# Patient Record
Sex: MALE | Race: WHITE | Employment: OTHER | URBAN - METROPOLITAN AREA
[De-identification: names, ages, dates, MRNs, and addresses within clinical notes are randomized per-mention and may not be internally consistent; named-entity substitution may affect disease eponyms.]

---

## 2018-07-10 ENCOUNTER — HOSPITAL ENCOUNTER (EMERGENCY)
Age: 58
Discharge: HOME | End: 2018-07-10
Attending: EMERGENCY MEDICINE | Admitting: EMERGENCY MEDICINE
Payer: MEDICARE

## 2018-07-10 VITALS
RESPIRATION RATE: 16 BRPM | TEMPERATURE: 98.2 F | WEIGHT: 204 LBS | SYSTOLIC BLOOD PRESSURE: 127 MMHG | HEIGHT: 70 IN | BODY MASS INDEX: 29.2 KG/M2 | DIASTOLIC BLOOD PRESSURE: 73 MMHG | HEART RATE: 84 BPM | OXYGEN SATURATION: 96 %

## 2018-07-10 DIAGNOSIS — G51.0 BELL'S PALSY: Primary | ICD-10-CM

## 2018-07-10 LAB
ALBUMIN SERPL-MCNC: 3.7 G/DL (ref 3.5–5)
ALBUMIN/GLOB SERPL: 1 {RATIO} (ref 1.1–2.2)
ALP SERPL-CCNC: 77 U/L (ref 45–117)
ALT SERPL-CCNC: 21 U/L (ref 12–78)
ANION GAP SERPL CALC-SCNC: 5 MMOL/L (ref 5–15)
AST SERPL-CCNC: 12 U/L (ref 15–37)
BASOPHILS # BLD: 0 K/UL (ref 0–0.1)
BASOPHILS NFR BLD: 1 % (ref 0–1)
BILIRUB SERPL-MCNC: 0.7 MG/DL (ref 0.2–1)
BUN SERPL-MCNC: 14 MG/DL (ref 6–20)
BUN/CREAT SERPL: 16 (ref 12–20)
CALCIUM SERPL-MCNC: 8.7 MG/DL (ref 8.5–10.1)
CHLORIDE SERPL-SCNC: 110 MMOL/L (ref 97–108)
CO2 SERPL-SCNC: 28 MMOL/L (ref 21–32)
CREAT SERPL-MCNC: 0.9 MG/DL (ref 0.7–1.3)
DIFFERENTIAL METHOD BLD: NORMAL
EOSINOPHIL # BLD: 0.1 K/UL (ref 0–0.4)
EOSINOPHIL NFR BLD: 2 % (ref 0–7)
ERYTHROCYTE [DISTWIDTH] IN BLOOD BY AUTOMATED COUNT: 12.8 % (ref 11.5–14.5)
GLOBULIN SER CALC-MCNC: 3.6 G/DL (ref 2–4)
GLUCOSE SERPL-MCNC: 110 MG/DL (ref 65–100)
HCT VFR BLD AUTO: 46.3 % (ref 36.6–50.3)
HGB BLD-MCNC: 15.7 G/DL (ref 12.1–17)
IMM GRANULOCYTES # BLD: 0 K/UL (ref 0–0.04)
IMM GRANULOCYTES NFR BLD AUTO: 0 % (ref 0–0.5)
LYMPHOCYTES # BLD: 1.5 K/UL (ref 0.8–3.5)
LYMPHOCYTES NFR BLD: 28 % (ref 12–49)
MCH RBC QN AUTO: 32.8 PG (ref 26–34)
MCHC RBC AUTO-ENTMCNC: 33.9 G/DL (ref 30–36.5)
MCV RBC AUTO: 96.9 FL (ref 80–99)
MONOCYTES # BLD: 0.6 K/UL (ref 0–1)
MONOCYTES NFR BLD: 11 % (ref 5–13)
NEUTS SEG # BLD: 3.1 K/UL (ref 1.8–8)
NEUTS SEG NFR BLD: 58 % (ref 32–75)
NRBC # BLD: 0 K/UL (ref 0–0.01)
NRBC BLD-RTO: 0 PER 100 WBC
PLATELET # BLD AUTO: 212 K/UL (ref 150–400)
PMV BLD AUTO: 9.3 FL (ref 8.9–12.9)
POTASSIUM SERPL-SCNC: 3.9 MMOL/L (ref 3.5–5.1)
PROT SERPL-MCNC: 7.3 G/DL (ref 6.4–8.2)
RBC # BLD AUTO: 4.78 M/UL (ref 4.1–5.7)
SODIUM SERPL-SCNC: 143 MMOL/L (ref 136–145)
WBC # BLD AUTO: 5.3 K/UL (ref 4.1–11.1)

## 2018-07-10 PROCEDURE — 99283 EMERGENCY DEPT VISIT LOW MDM: CPT

## 2018-07-10 PROCEDURE — 36415 COLL VENOUS BLD VENIPUNCTURE: CPT

## 2018-07-10 PROCEDURE — 85025 COMPLETE CBC W/AUTO DIFF WBC: CPT

## 2018-07-10 PROCEDURE — 80053 COMPREHEN METABOLIC PANEL: CPT

## 2018-07-10 RX ORDER — PREDNISONE 20 MG/1
60 TABLET ORAL DAILY
Qty: 21 TAB | Refills: 0 | Status: SHIPPED | OUTPATIENT
Start: 2018-07-10 | End: 2018-07-17

## 2018-07-10 RX ORDER — VALACYCLOVIR HYDROCHLORIDE 1 G/1
1000 TABLET, FILM COATED ORAL 3 TIMES DAILY
Qty: 21 TAB | Refills: 0 | Status: SHIPPED | OUTPATIENT
Start: 2018-07-10 | End: 2018-07-17

## 2018-07-10 NOTE — ED NOTES
Pt discharged by MD Roman . Pt provided with discharge instructions Rx and instructions on follow up care. Pt out of ED ambulatory accompanied by family member.
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
## 2018-07-10 NOTE — DISCHARGE INSTRUCTIONS
Bell's Palsy: Care Instructions  Your Care Instructions    Bell's palsy is paralysis or weakness of the muscles on one side of the face. Often people with Bell's palsy have a droop on one side of the mouth and have trouble completely shutting the eye on the same side. Bell's palsy can also interfere with the sense of taste. These things happen when a nerve in your face becomes inflamed. Bell's palsy is not caused by a stroke. The cause of the nerve inflammation is not known. But some experts think that a virus may cause it. Because of this, doctors sometimes prescribe antiviral medicine to treat it. You also may get medicine to reduce swelling. Bell's palsy usually gets better on its own in a few weeks or months. Follow-up care is a key part of your treatment and safety. Be sure to make and go to all appointments, and call your doctor if you are having problems. It's also a good idea to know your test results and keep a list of the medicines you take. How can you care for yourself at home? · Take your medicines exactly as prescribed. Call your doctor if you think you are having a problem with your medicine. You will get more details on the specific medicines your doctor prescribes. · Use artificial tears or ointment if your eyes are too dry. Bell's palsy can make your lower eyelid droop, causing a dry eye. · If you cannot completely close your eye, consider using an eye patch while you sleep. · Help yourself blink by using your finger to close and open your eyelid. This may help keep your eye moist.  · Wear glasses or goggles to keep dust and dirt out of your eye. · As feeling comes back to your face, massage your forehead, cheeks, and lips. Massage may make the muscles in your face stronger. · Brush and floss your teeth often to help prevent tooth decay. Bell's palsy can dry up the spit on one side of your mouth. This increases the risk of tooth decay. When should you call for help?   Call 911 anytime you think you may need emergency care. For example, call if:  ? · You have symptoms of a stroke. These may include:  ¨ Sudden numbness, tingling, weakness, or loss of movement in your face, arm, or leg, especially on only one side of your body. ¨ Sudden vision changes. ¨ Sudden trouble speaking. ¨ Sudden confusion or trouble understanding simple statements. ¨ Sudden problems with walking or balance. ¨ A sudden, severe headache that is different from past headaches. ?Call your doctor now or seek immediate medical care if:  ? · You have numbness or weakness that spreads beyond one side of your face. ? · You have a skin rash or eye pain or redness, or light bothers your eyes. ? · You have a new or worse headache. ? Watch closely for changes in your health, and be sure to contact your doctor if:  ? · You do not get better as expected. Where can you learn more? Go to http://elsa-newton.info/. Enter P168 in the search box to learn more about \"Bell's Palsy: Care Instructions. \"  Current as of: October 14, 2016  Content Version: 11.4  © 2984-0294 [employer]. Care instructions adapted under license by [employer] (which disclaims liability or warranty for this information). If you have questions about a medical condition or this instruction, always ask your healthcare professional. [address] any warranty or liability for your use of this information.

## 2018-07-10 NOTE — ED NOTES
Patient resting with family at bedside awaiting results. Patient denies any need at this time. Call bell in reach.

## 2018-07-10 NOTE — ED NOTES
Assumed care of patient. Patient states he noticed left sided facial droop, numbess to face on Saturday. Patient thought possibly have reaction to spicy food. Patient denies SOB, CP. Patient placed on monitor x 3. Patient is ANO x 4. Family at bedside. Call bell in reach.

## 2018-11-26 ENCOUNTER — APPOINTMENT (OUTPATIENT)
Dept: GENERAL RADIOLOGY | Age: 58
End: 2018-11-26
Attending: EMERGENCY MEDICINE
Payer: MEDICARE

## 2018-11-26 ENCOUNTER — HOSPITAL ENCOUNTER (EMERGENCY)
Age: 58
Discharge: HOME | End: 2018-11-26
Attending: EMERGENCY MEDICINE
Payer: MEDICARE

## 2018-11-26 VITALS
HEIGHT: 69 IN | HEART RATE: 97 BPM | WEIGHT: 184.75 LBS | BODY MASS INDEX: 27.36 KG/M2 | DIASTOLIC BLOOD PRESSURE: 91 MMHG | OXYGEN SATURATION: 96 % | SYSTOLIC BLOOD PRESSURE: 155 MMHG | TEMPERATURE: 98.5 F | RESPIRATION RATE: 16 BRPM

## 2018-11-26 DIAGNOSIS — J06.9 ACUTE UPPER RESPIRATORY INFECTION: ICD-10-CM

## 2018-11-26 DIAGNOSIS — Z72.0 TOBACCO USE: ICD-10-CM

## 2018-11-26 DIAGNOSIS — I10 ACCELERATED HYPERTENSION: ICD-10-CM

## 2018-11-26 DIAGNOSIS — J02.9 ACUTE VIRAL PHARYNGITIS: ICD-10-CM

## 2018-11-26 DIAGNOSIS — J20.9 ACUTE BRONCHITIS, UNSPECIFIED ORGANISM: Primary | ICD-10-CM

## 2018-11-26 PROCEDURE — 71046 X-RAY EXAM CHEST 2 VIEWS: CPT

## 2018-11-26 PROCEDURE — 99282 EMERGENCY DEPT VISIT SF MDM: CPT

## 2018-11-26 RX ORDER — BENZONATATE 100 MG/1
100 CAPSULE ORAL
Qty: 30 CAP | Refills: 0 | Status: SHIPPED | OUTPATIENT
Start: 2018-11-26 | End: 2018-12-03

## 2018-11-26 RX ORDER — PREDNISONE 10 MG/1
TABLET ORAL
Qty: 21 TAB | Refills: 0 | Status: SHIPPED | OUTPATIENT
Start: 2018-11-26

## 2018-11-26 RX ORDER — AZITHROMYCIN 250 MG/1
TABLET, FILM COATED ORAL
Qty: 6 TAB | Refills: 0 | Status: SHIPPED | OUTPATIENT
Start: 2018-11-26 | End: 2018-12-01

## 2018-11-26 NOTE — ED PROVIDER NOTES
EMERGENCY DEPARTMENT HISTORY AND PHYSICAL EXAM 
 
 
Date: 11/26/2018 Patient Name: Carmen Curtis History of Presenting Illness Chief Complaint Patient presents with  Cough Pt ambulatory to triage with c/o chest congestion, cough, nasal congestion, sore throat x Saturday; pt states fever, however unaware how high; has not had medication for fever today  Sore Throat History Provided By: Patient HPI: Carmen Curtis, 60 y.o. male with no significant PMHx, presents ambulatory to the ED with cc of a chest congestion to which he describes as a [place] in my chest\" since October 5th. He reports associated symptoms of a mild sore throat, non-productive cough. He states he had attempted to treat his symptoms with a variety of Mucinex with no relief. He is also complaining of a \"rattling\" in his chest. He endorses being a tobacco user but denies a h/o COPD or asthma. Additionally, pt specifically denies any recent fever, chills, headache, nausea, vomiting, diarrhea, abdominal pain, CP, SOB, lightheadedness, dizziness, numbness, weakness, tingling, BLE swelling, heart palpitations, urinary sxs, changes in BM, changes in PO intake, melena, hematochezia. PCP: Shaunna Singer MD 
 
PMHx: Significant for none PSHx: Significant for R AKA, ACL reconstruction Social Hx: +tobacco 
 
There are no other complaints, changes or physical findings at this time. Past History Past Medical History: 
Denies Past Surgical History: 
Past Surgical History:  
Procedure Laterality Date  HX ABOVE KNEE AMPUTATION    
 right  HX ACL RECONSTRUCTION Family History: 
Denies Social History: 
Social History Tobacco Use  Smoking status: Current Every Day Smoker Substance Use Topics  Alcohol use: Not on file  Drug use: Not on file Allergies: 
No Known Allergies Review of Systems Review of Systems Constitutional: Negative. Negative for chills and fever. HENT: Positive for congestion and sore throat. Negative for facial swelling, rhinorrhea, trouble swallowing and voice change. Eyes: Negative. Respiratory: Positive for cough. Negative for apnea, chest tightness, shortness of breath and wheezing. Cardiovascular: Negative. Negative for chest pain, palpitations and leg swelling. Gastrointestinal: Negative. Negative for abdominal distention, abdominal pain, blood in stool, constipation, diarrhea, nausea and vomiting. Endocrine: Negative. Negative for cold intolerance, heat intolerance and polyuria. Genitourinary: Negative. Negative for difficulty urinating, dysuria, flank pain, frequency, hematuria and urgency. Musculoskeletal: Negative. Negative for arthralgias, back pain, myalgias, neck pain and neck stiffness. Skin: Negative. Negative for color change and rash. Neurological: Negative. Negative for dizziness, syncope, facial asymmetry, speech difficulty, weakness, light-headedness, numbness and headaches. Hematological: Negative. Does not bruise/bleed easily. Psychiatric/Behavioral: Negative. Negative for confusion and self-injury. The patient is not nervous/anxious. Physical Exam  
Physical Exam  
Constitutional: He is oriented to person, place, and time. Vital signs are normal. He appears well-developed and well-nourished. He is cooperative. Non-toxic appearance. HENT:  
Head: Normocephalic and atraumatic. Mouth/Throat: Mucous membranes are normal. No posterior oropharyngeal erythema. Eyes: Conjunctivae and EOM are normal. Pupils are equal, round, and reactive to light. Neck: Normal range of motion. Cardiovascular: Normal rate, regular rhythm, normal heart sounds and intact distal pulses. Exam reveals no gallop and no friction rub. No murmur heard. Pulmonary/Chest: Effort normal and breath sounds normal. No respiratory distress. He has no wheezes. He has no rales. He exhibits no tenderness. Abdominal: Soft. Bowel sounds are normal. He exhibits no distension and no mass. There is no tenderness. There is no rebound and no guarding. Musculoskeletal: Normal range of motion. He exhibits no edema, tenderness or deformity. Neurological: He is alert and oriented to person, place, and time. He displays normal reflexes. No cranial nerve deficit. He exhibits normal muscle tone. Coordination normal.  
Skin: Skin is warm. No rash noted. Psychiatric: He has a normal mood and affect. Nursing note and vitals reviewed. Diagnostic Study Results Labs - No results found for this or any previous visit (from the past 24 hour(s)). Radiologic Studies - CXR Results  (Last 48 hours)  
          
 11/26/18 1431  XR CHEST PA LAT Final result Impression:  IMPRESSION: No acute intrathoracic disease. Narrative:  CLINICAL HISTORY: Cough and congestion INDICATION: Cough COMPARISON: None FINDINGS:   
PA and lateral views of the chest are obtained. The cardiopericardial silhouette is within normal limits. There is no pleural  
effusion, pneumothorax or focal consolidation present. Medical Decision Making I am the first provider for this patient. I reviewed the vital signs, available nursing notes, past medical history, past surgical history, family history and social history. Vital Signs-Reviewed the patient's vital signs. Patient Vitals for the past 24 hrs: 
 Temp Pulse Resp BP SpO2  
11/26/18 1258 98.5 °F (36.9 °C) 97 16 (!) 155/91 96 % Records Reviewed: Nursing Notes, Old Medical Records, Previous electrocardiograms, Previous Radiology Studies and Previous Laboratory Studies Provider Notes (Medical Decision Making): Pt presents with flu like symptoms including nasal congestion, rhinorrhea and sore throat. Pt also has non-productive cough without dyspnea or wheezing. Symptoms are consistent with an uncomplicated viral URI.   DDx: bacterial sinusitis vs. pharyngitis, migraine, flu. Symptomatic therapy suggested: acetaminophen, ibuprofen, antihistamine-decongestant of choice, cough suppressant of choice. Increase fluids, use vaporizer, stay in steamy bathroom tid 15 min prn severe cough, tylenol as needed, rest, avoid smoky areas. Lack of antibiotic effectiveness discussed with him. Symptomatic therapy suggested: gargle for sore throat, use mist at bedside for congestion. Apply facial warm packs for sinus pain or use nasal saline sprays. Follow up prn if not better in 72 hours. ED Course:  
Initial assessment performed. The patients presenting problems have been discussed, and they are in agreement with the care plan formulated and outlined with them. I have encouraged them to ask questions as they arise throughout their visit. Medications Administered During ED Management[de-identified] 
Medications - No data to display I reviewed our electronic medical record system for any past medical records that were available that may contribute to the patients current condition, the nursing notes and vital signs from today's visit. Chana Ding MD 
 
TOBACCO COUNSELIN:54 PM 
Upon evaluation, pt expressed that they are a current tobacco user. For approximately 10 minutes, pt has been counseled on the dangers of smoking and was encouraged to quit as soon as possible in order to decrease further risks to their health. Pt has conveyed their understanding of the risks involved should they continue to use tobacco products. Progress Note 2:50 PM 
Patient reports feeling better and symptoms have improved after ED treatment. Pt able to tolerate PO and ambulate per baseline. Darryn Desir's final labs and imaging have been reviewed with him. He has been counseled regarding his diagnosis.   He verbally conveys understanding and agreement of the signs, symptoms, diagnosis, treatment and prognosis and additionally agrees to follow up as recommended with Dr. Aminata Huston MD in 24 - 48 hours. He also agrees with the care-plan and conveys that all of his questions have been answered. I have also put together some discharge instructions for him that include: 1) educational information regarding their diagnosis, 2) how to care for their diagnosis at home, as well a 3) list of reasons why they would want to return to the ED prior to their follow-up appointment, should their condition change. Critical Care Time:  
0 Disposition: 
DISCHARGE NOTE 
2:54 PM 
The patient has been re-evaluated and is ready for discharge. Reviewed available results with patient. Counseled patient on diagnosis and care plan. Patient has expressed understanding, and all questions have been answered. Patient agrees with plan and agrees to follow up as recommended, or return to the ED if their symptoms worsen. Discharge instructions have been provided and explained to the patient, along with reasons to return to the ED. PLAN: 
1. Discharge Medication List as of 11/26/2018  2:53 PM  
  
START taking these medications Details  
albuterol sulfate 90 mcg/actuation aepb Take 2 Puffs by inhalation every six (6) hours as needed. , Print, Disp-2 Inhaler, R-0  
  
predniSONE (STERAPRED DS) 10 mg dose pack Take as prescribed, Print, Disp-21 Tab, R-0  
  
azithromycin (ZITHROMAX) 250 mg tablet Take 2 tablets (500mg) on day 1, and then 1 tablet (250mg) daily for days 2-5., Print, Disp-6 Tab, R-0  
  
benzonatate (TESSALON PERLES) 100 mg capsule Take 1 Cap by mouth three (3) times daily as needed for Cough for up to 7 days. , Print, Disp-30 Cap, R-0  
  
  
 
2. Follow-up Information Follow up With Specialties Details Why Contact Info Aminata Huston MD [address] [facility] 
[phone] [facility] EMERGENCY DEPT Emergency Medicine  As needed, If symptoms worsen [address] [facility] [address] [facility] 
[phone] Return to ED if worse Diagnosis Clinical Impression: 1. Acute bronchitis, unspecified organism 2. Tobacco use 3. Acute viral pharyngitis 4. Acute upper respiratory infection 5. Accelerated hypertension Attestations This note is prepared by Corey Gomez, acting as Scribe for Epifanio Oppenheim, MD Epifanio Oppenheim, MD : The scribe's documentation has been prepared under my direction and personally reviewed by me in its entirety. I confirm that the note above accurately reflects all work, treatment, procedures, and medical decision making performed by me. 
 
: 
 
This note will not be viewable in [address] Marino Quiroz

## 2018-11-26 NOTE — DISCHARGE INSTRUCTIONS
Thank you for allowing us to take care of you today! We hope we addressed all of your concerns and needs. We strive to provide excellent quality care in the Emergency Department. You will receive a survey after your visit to evaluate the care you were provided. Should you receive a survey from us, we invite you to share your experience and tell us what made it excellent. It was a pleasure serving you, we invite you to share your experience with us, in our pursuit for excellence, should you be selected to receive a survey. The exam and treatment you received in the Emergency Department were for an urgent problem and are not intended as complete care. It is important that you follow up with a doctor, nurse practitioner, or physician assistant for ongoing care. If your symptoms become worse or you do not improve as expected and you are unable to reach your usual health care provider, you should return to the Emergency Department. We are available 24 hours a day. Please take your discharge instructions with you when you go to your follow-up appointment. If you have any problem arranging a follow-up appointment, contact the Emergency Department immediately. If a prescription has been provided, please have it filled as soon as possible to prevent a delay in treatment. Read the entire medication instruction sheet provided to you by the pharmacy. If you have any questions or reservations about taking the medication due to side effects or interactions with other medications, please call your primary care physician or contact the ER to speak with the charge nurse. Make an appointment with your family doctor or the physician you were referred to for follow-up of this visit as instructed on your discharge paperwork, as this is mandatory follow-up. Return to the ER if you are unable to be seen or if you are unable to be seen in a timely manner.     If you have any problem arranging the follow-up visit, contact the Emergency Department immediately. I hope you feel better and thank you again for allow us to provide you with excellent care today at [facility]! Warmest regards,    Jacobo Ortiz MD  Emergency Medicine Physician  [facility]           Bronchitis: Care Instructions  Your Care Instructions    Bronchitis is inflammation of the bronchial tubes, which carry air to the lungs. The tubes swell and produce mucus, or phlegm. The mucus and inflamed bronchial tubes make you cough. You may have trouble breathing. Most cases of bronchitis are caused by viruses like those that cause colds. Antibiotics usually do not help and they may be harmful. Bronchitis usually develops rapidly and lasts about 2 to 3 weeks in otherwise healthy people. Follow-up care is a key part of your treatment and safety. Be sure to make and go to all appointments, and call your doctor if you are having problems. It's also a good idea to know your test results and keep a list of the medicines you take. How can you care for yourself at home? · Take all medicines exactly as prescribed. Call your doctor if you think you are having a problem with your medicine. · Get some extra rest.  · Take an over-the-counter pain medicine, such as acetaminophen (Tylenol), ibuprofen (Advil, Motrin), or naproxen (Aleve) to reduce fever and relieve body aches. Read and follow all instructions on the label. · Do not take two or more pain medicines at the same time unless the doctor told you to. Many pain medicines have acetaminophen, which is Tylenol. Too much acetaminophen (Tylenol) can be harmful. · Take an over-the-counter cough medicine that contains dextromethorphan to help quiet a dry, hacking cough so that you can sleep. Avoid cough medicines that have more than one active ingredient. Read and follow all instructions on the label.   · Breathe moist air from a humidifier, hot shower, or sink filled with hot water. The heat and moisture will thin mucus so you can cough it out. · Do not smoke. Smoking can make bronchitis worse. If you need help quitting, talk to your doctor about stop-smoking programs and medicines. These can increase your chances of quitting for good. When should you call for help? Call 911 anytime you think you may need emergency care. For example, call if:    · You have severe trouble breathing.    Call your doctor now or seek immediate medical care if:    · You have new or worse trouble breathing.     · You cough up dark brown or bloody mucus (sputum).     · You have a new or higher fever.     · You have a new rash.    Watch closely for changes in your health, and be sure to contact your doctor if:    · You cough more deeply or more often, especially if you notice more mucus or a change in the color of your mucus.     · You are not getting better as expected. Where can you learn more? Go to http://elsa-newton.info/. Enter H333 in the search box to learn more about \"Bronchitis: Care Instructions. \"  Current as of: December 6, 2017  Content Version: 11.8  © 6896-3157 [employer]. Care instructions adapted under license by [employer] (which disclaims liability or warranty for this information). If you have questions about a medical condition or this instruction, always ask your healthcare professional. [address] any warranty or liability for your use of this information.

## 2019-01-18 ENCOUNTER — HOSPITAL ENCOUNTER (EMERGENCY)
Age: 59
Discharge: HOME | End: 2019-01-19
Attending: EMERGENCY MEDICINE
Payer: MEDICARE

## 2019-01-18 DIAGNOSIS — M54.13 RADICULOPATHY OF CERVICOTHORACIC REGION: ICD-10-CM

## 2019-01-18 DIAGNOSIS — J20.9 ACUTE BRONCHITIS, UNSPECIFIED ORGANISM: Primary | ICD-10-CM

## 2019-01-18 DIAGNOSIS — F17.200 TOBACCO DEPENDENCE: ICD-10-CM

## 2019-01-18 PROCEDURE — 99284 EMERGENCY DEPT VISIT MOD MDM: CPT

## 2019-01-18 PROCEDURE — 94640 AIRWAY INHALATION TREATMENT: CPT

## 2019-01-18 PROCEDURE — 77030029684 HC NEB SM VOL KT MONA -A

## 2019-01-19 ENCOUNTER — APPOINTMENT (OUTPATIENT)
Dept: GENERAL RADIOLOGY | Age: 59
End: 2019-01-19
Attending: EMERGENCY MEDICINE
Payer: MEDICARE

## 2019-01-19 VITALS
HEIGHT: 69 IN | DIASTOLIC BLOOD PRESSURE: 69 MMHG | BODY MASS INDEX: 27.66 KG/M2 | OXYGEN SATURATION: 94 % | SYSTOLIC BLOOD PRESSURE: 137 MMHG | TEMPERATURE: 97.8 F | WEIGHT: 186.73 LBS | RESPIRATION RATE: 18 BRPM | HEART RATE: 81 BPM

## 2019-01-19 LAB
ALBUMIN SERPL-MCNC: 3.9 G/DL (ref 3.5–5)
ALBUMIN/GLOB SERPL: 1.1 {RATIO} (ref 1.1–2.2)
ALP SERPL-CCNC: 78 U/L (ref 45–117)
ALT SERPL-CCNC: 51 U/L (ref 12–78)
ANION GAP SERPL CALC-SCNC: 7 MMOL/L (ref 5–15)
AST SERPL-CCNC: 29 U/L (ref 15–37)
BASOPHILS # BLD: 0.1 K/UL (ref 0–0.1)
BASOPHILS NFR BLD: 1 % (ref 0–1)
BILIRUB SERPL-MCNC: 0.4 MG/DL (ref 0.2–1)
BNP SERPL-MCNC: 432 PG/ML (ref 0–125)
BUN SERPL-MCNC: 14 MG/DL (ref 6–20)
BUN/CREAT SERPL: 15 (ref 12–20)
CALCIUM SERPL-MCNC: 9.1 MG/DL (ref 8.5–10.1)
CHLORIDE SERPL-SCNC: 108 MMOL/L (ref 97–108)
CO2 SERPL-SCNC: 26 MMOL/L (ref 21–32)
CREAT SERPL-MCNC: 0.91 MG/DL (ref 0.7–1.3)
D DIMER PPP FEU-MCNC: 0.27 MG/L FEU (ref 0–0.65)
DIFFERENTIAL METHOD BLD: NORMAL
EOSINOPHIL # BLD: 0.2 K/UL (ref 0–0.4)
EOSINOPHIL NFR BLD: 3 % (ref 0–7)
ERYTHROCYTE [DISTWIDTH] IN BLOOD BY AUTOMATED COUNT: 13.1 % (ref 11.5–14.5)
GLOBULIN SER CALC-MCNC: 3.4 G/DL (ref 2–4)
GLUCOSE SERPL-MCNC: 95 MG/DL (ref 65–100)
HCT VFR BLD AUTO: 48.1 % (ref 36.6–50.3)
HGB BLD-MCNC: 16.4 G/DL (ref 12.1–17)
IMM GRANULOCYTES # BLD AUTO: 0 K/UL (ref 0–0.04)
IMM GRANULOCYTES NFR BLD AUTO: 0 % (ref 0–0.5)
LYMPHOCYTES # BLD: 2.6 K/UL (ref 0.8–3.5)
LYMPHOCYTES NFR BLD: 41 % (ref 12–49)
MCH RBC QN AUTO: 32.8 PG (ref 26–34)
MCHC RBC AUTO-ENTMCNC: 34.1 G/DL (ref 30–36.5)
MCV RBC AUTO: 96.2 FL (ref 80–99)
MONOCYTES # BLD: 0.5 K/UL (ref 0–1)
MONOCYTES NFR BLD: 8 % (ref 5–13)
NEUTS SEG # BLD: 3 K/UL (ref 1.8–8)
NEUTS SEG NFR BLD: 47 % (ref 32–75)
NRBC # BLD: 0 K/UL (ref 0–0.01)
NRBC BLD-RTO: 0 PER 100 WBC
PLATELET # BLD AUTO: 228 K/UL (ref 150–400)
PMV BLD AUTO: 9.3 FL (ref 8.9–12.9)
POTASSIUM SERPL-SCNC: 3.8 MMOL/L (ref 3.5–5.1)
PROT SERPL-MCNC: 7.3 G/DL (ref 6.4–8.2)
RBC # BLD AUTO: 5 M/UL (ref 4.1–5.7)
SODIUM SERPL-SCNC: 141 MMOL/L (ref 136–145)
WBC # BLD AUTO: 6.3 K/UL (ref 4.1–11.1)

## 2019-01-19 PROCEDURE — 83880 ASSAY OF NATRIURETIC PEPTIDE: CPT

## 2019-01-19 PROCEDURE — 74011250637 HC RX REV CODE- 250/637: Performed by: EMERGENCY MEDICINE

## 2019-01-19 PROCEDURE — 36415 COLL VENOUS BLD VENIPUNCTURE: CPT

## 2019-01-19 PROCEDURE — 74011250636 HC RX REV CODE- 250/636: Performed by: EMERGENCY MEDICINE

## 2019-01-19 PROCEDURE — 85379 FIBRIN DEGRADATION QUANT: CPT

## 2019-01-19 PROCEDURE — 74011000250 HC RX REV CODE- 250: Performed by: EMERGENCY MEDICINE

## 2019-01-19 PROCEDURE — 80053 COMPREHEN METABOLIC PANEL: CPT

## 2019-01-19 PROCEDURE — 96375 TX/PRO/DX INJ NEW DRUG ADDON: CPT

## 2019-01-19 PROCEDURE — 71046 X-RAY EXAM CHEST 2 VIEWS: CPT

## 2019-01-19 PROCEDURE — 96374 THER/PROPH/DIAG INJ IV PUSH: CPT

## 2019-01-19 PROCEDURE — 85025 COMPLETE CBC W/AUTO DIFF WBC: CPT

## 2019-01-19 RX ORDER — ALBUTEROL SULFATE 90 UG/1
2 AEROSOL, METERED RESPIRATORY (INHALATION)
Qty: 1 INHALER | Refills: 0 | Status: SHIPPED | OUTPATIENT
Start: 2019-01-19

## 2019-01-19 RX ORDER — DIAZEPAM 5 MG/1
5 TABLET ORAL
Qty: 15 TAB | Refills: 0 | Status: SHIPPED | OUTPATIENT
Start: 2019-01-19

## 2019-01-19 RX ORDER — KETOROLAC TROMETHAMINE 30 MG/ML
15 INJECTION, SOLUTION INTRAMUSCULAR; INTRAVENOUS
Status: COMPLETED | OUTPATIENT
Start: 2019-01-19 | End: 2019-01-19

## 2019-01-19 RX ORDER — PREDNISONE 10 MG/1
TABLET ORAL
Qty: 48 TAB | Refills: 0 | Status: SHIPPED | OUTPATIENT
Start: 2019-01-19

## 2019-01-19 RX ORDER — IPRATROPIUM BROMIDE AND ALBUTEROL SULFATE 2.5; .5 MG/3ML; MG/3ML
3 SOLUTION RESPIRATORY (INHALATION)
Status: COMPLETED | OUTPATIENT
Start: 2019-01-19 | End: 2019-01-19

## 2019-01-19 RX ORDER — DIAZEPAM 5 MG/1
5 TABLET ORAL
Status: COMPLETED | OUTPATIENT
Start: 2019-01-19 | End: 2019-01-19

## 2019-01-19 RX ORDER — DOXYCYCLINE HYCLATE 100 MG
100 TABLET ORAL 2 TIMES DAILY
Qty: 14 TAB | Refills: 0 | Status: SHIPPED | OUTPATIENT
Start: 2019-01-19 | End: 2019-01-26

## 2019-01-19 RX ADMIN — KETOROLAC TROMETHAMINE 15 MG: 30 INJECTION, SOLUTION INTRAMUSCULAR at 01:59

## 2019-01-19 RX ADMIN — IPRATROPIUM BROMIDE AND ALBUTEROL SULFATE 3 ML: .5; 3 SOLUTION RESPIRATORY (INHALATION) at 02:00

## 2019-01-19 RX ADMIN — METHYLPREDNISOLONE SODIUM SUCCINATE 125 MG: 125 INJECTION, POWDER, FOR SOLUTION INTRAMUSCULAR; INTRAVENOUS at 01:45

## 2019-01-19 RX ADMIN — DIAZEPAM 5 MG: 5 TABLET ORAL at 01:58

## 2019-01-19 RX ADMIN — IPRATROPIUM BROMIDE AND ALBUTEROL SULFATE 3 ML: .5; 3 SOLUTION RESPIRATORY (INHALATION) at 00:45

## 2019-01-19 NOTE — ED NOTES
Dr _____Knorr___________________ in to talk with patient and explain plan of care with  understanding and  written & verbal instructions.

## 2019-01-19 NOTE — ED PROVIDER NOTES
[facility] 76. EMERGENCY DEPARTMENT HISTORY AND PHYSICAL EXAM  
 
 
Date of Service: 1/18/2019 Patient Name: Sully Purcell YOB: 1960 Medical Record Number: [id number] History of Presenting Illness Chief Complaint Patient presents with  Cough He was diagnosed with bronchitis Monday after thanksgiving and is not getting any better  Shortness of Breath Occasional but he is a smoker.  Shoulder Pain Now he is having pain in his upper back and shoulder on the right. History Provided By:  patient Additional History:  
 
Sully Purcell is a 60 y.o. male, pmhx for R BKA, who presents ambulatory to the ED c/o persistent, progressively worsening cough and SOB starting on 10/6/18. He also c/o sharp pain in the pt's R shoulder and elbow that is exacerbated w/ head turning and applying pressure. He denies a PMHx of COPD. He reports taking hydrocodone at 2000 yesterday w/o relief. He also reports taking advil, tylenol, and his friend's arthritis pills yesterday w/o relief. He states that he was dx'd w/ bronchitis in November 2018 and given a Z-mu and inhalers. He states that he takes OTC omeprazole for refulx. He denies visiting a cardiologist or pulmonologist in the past or previous dx of COPD. He denies having medication allergies. He specifically denies any recent fever, chills, nausea, vomiting, diarrhea, abd pain, CP, lightheadedness, dizziness, numbness, weakness, tingling, LE swelling, HA, heart palpitations, urinary sxs, changes in BM, changes in PO intake, melena, hematochezia, or congestion. PMHx: Significant for bronchitis PSHx: Significant for R MARGOT Social Hx: +tobacco (1 pack/day), -EtOH, -Illicit Drugs There are no other complaints, changes, or physical findings at this time. Primary Care Provider: Trae Larson MD  
 Past History Past Medical History: No past medical history on file. Past Surgical History:  
Past Surgical History:  
Procedure Laterality Date  HX ABOVE KNEE AMPUTATION    
 right  HX ACL RECONSTRUCTION Family History: No family history on file. Social History:  
Social History Tobacco Use  Smoking status: Current Every Day Smoker Substance Use Topics  Alcohol use: Not on file  Drug use: Not on file Allergies:  
No Known Allergies Review of Systems Review of Systems Constitutional: Negative for chills and fever. HENT: Negative. Eyes: Negative. Respiratory: Positive for cough and shortness of breath. Negative for chest tightness. Cardiovascular: Negative for chest pain and leg swelling. Gastrointestinal: Negative for abdominal pain, diarrhea, nausea and vomiting. Endocrine: Negative. Genitourinary: Negative for difficulty urinating and dysuria. Musculoskeletal: Positive for arthralgias and myalgias. Skin: Negative. Neurological: Negative. Psychiatric/Behavioral: Negative. All other systems reviewed and are negative. Physical Exam 
 
Physical Exam  
Constitutional: He is oriented to person, place, and time. He appears well-developed and well-nourished. No distress. HENT:  
Head: Normocephalic and atraumatic. Nose: Nose normal.  
Mouth/Throat: No oropharyngeal exudate. Eyes: Conjunctivae and EOM are normal. Pupils are equal, round, and reactive to light. Neck: Normal range of motion. Neck supple. No JVD present. Cardiovascular: Normal rate, regular rhythm, normal heart sounds and intact distal pulses. Exam reveals no friction rub. No murmur heard. Pulmonary/Chest: Effort normal. No stridor. No tachypnea. No respiratory distress. He has no decreased breath sounds. He has wheezes in the right upper field and the right middle field. He has no rales. Pt able to speak in full, unlabored sentences. Abdominal: Soft. Bowel sounds are normal. He exhibits no distension.  There is no tenderness. There is no rebound. Musculoskeletal: Normal range of motion. He exhibits no tenderness. Cervical back: He exhibits pain. He exhibits no tenderness and no bony tenderness. Back: 
 
Neurological: He is alert and oriented to person, place, and time. No cranial nerve deficit. Skin: Skin is warm and dry. No rash noted. He is not diaphoretic. Psychiatric: He has a normal mood and affect. His speech is normal and behavior is normal. Judgment and thought content normal. Cognition and memory are normal.  
Nursing note and vitals reviewed. MDM:  
DDX: 
COPD exacerbation, pna, pe, acs, pulmonary edema, pleural effusion, radicular pain Plan: 
Labs, cxr, neb, steroids, d dimer, analgesic, muscle relaxant Impression: 
Chronic bronchitis, scapular pain Provider Notes I am the first provider for this patient. I reviewed the vital signs, available nursing notes, past medical history, past surgical history, family history and social history. I reviewed our electronic medical record system for any past medical records that were available that may contribute to the patients current condition, the nursing notes and and vital signs from today's visit Nursing notes will be reviewed as they become available in realtime while the pt has been in the ED. Talia Pierre MD 
 
 
Old Medical Records: Old medical records. Nursing notes. ED Course/Progress Notes: 
1:02 AM  
Initial assessment performed. The patients presenting problems have been discussed, and they are in agreement with the care plan formulated and outlined with them. I have encouraged them to ask questions as they arise throughout their visit. 
 
1:02 AM 
Pulse Oximetry Analysis - 93% on RA Cardiac Monitor:  
Rate: 102bpm 
Rhythm: Sinus Tachycardia I have spent 3-7 minutes discussing the medical risks of prolonged smoking habits and advised the patient of the benefits of the cessation of smoking, providing specific suggestions on how to quit. Caden Haskins MD 
 
 
I personally reviewed pt's imaging. Official read by radiology listed below. Caden Haskins MD 
 
PROGRESS NOTE: 
2:10 AM 
Pt reevaluated. Pt's \"chest rattling\" is better. His arm pain is better but his shoulder pain is still present. Pt recently medicated and working on 2nd neb. Awaiting d dimer and will re-eval for sx improvement. Written by iDck Bennett ED Scribe, as dictated by Caden Haskins MD 
 
 
3:20 AM 
Progress note: 
Pt noted to be feeling better, ready for discharge. Discussed lab and imaging findings with pt and/or family, specifically noting negative cxr for pna and negative d dimer. Pt will follow up as instructed. All questions have been answered, pt voiced understanding and agreement with plan. If narcotics were prescribed, pt was advised not to drive or operate heavy machinery. If abx were prescribed, pt advised that diarrhea and rash are possible side effects of the medications. Specific return precautions provided in addition to instructions for pt to return to the ED immediately should sx worsen at any time. Caden Haskins MD 
 
 
Critical Care Time: This note will not be viewable in [address] Diagnostic Study Results:    
Labs Recent Results (from the past 12 hour(s)) CBC WITH AUTOMATED DIFF Collection Time: 01/19/19 12:15 AM  
Result Value Ref Range WBC 6.3 4.1 - 11.1 K/uL  
 RBC 5.00 4. 10 - 5.70 M/uL  
 HGB 16.4 12.1 - 17.0 g/dL HCT 48.1 36.6 - 50.3 % MCV 96.2 80.0 - 99.0 FL  
 MCH 32.8 26.0 - 34.0 PG  
 MCHC 34.1 30.0 - 36.5 g/dL  
 RDW 13.1 11.5 - 14.5 % PLATELET 491 619 - 507 K/uL MPV 9.3 8.9 - 12.9 FL  
 NRBC 0.0 0  WBC ABSOLUTE NRBC 0.00 0.00 - 0.01 K/uL NEUTROPHILS 47 32 - 75 % LYMPHOCYTES 41 12 - 49 % MONOCYTES 8 5 - 13 % EOSINOPHILS 3 0 - 7 % BASOPHILS 1 0 - 1 % IMMATURE GRANULOCYTES 0 0.0 - 0.5 % ABS. NEUTROPHILS 3.0 1.8 - 8.0 K/UL  
 ABS. LYMPHOCYTES 2.6 0.8 - 3.5 K/UL  
 ABS. MONOCYTES 0.5 0.0 - 1.0 K/UL  
 ABS. EOSINOPHILS 0.2 0.0 - 0.4 K/UL  
 ABS. BASOPHILS 0.1 0.0 - 0.1 K/UL  
 ABS. IMM. GRANS. 0.0 0.00 - 0.04 K/UL  
 DF AUTOMATED METABOLIC PANEL, COMPREHENSIVE Collection Time: 01/19/19 12:15 AM  
Result Value Ref Range Sodium 141 136 - 145 mmol/L Potassium 3.8 3.5 - 5.1 mmol/L Chloride 108 97 - 108 mmol/L  
 CO2 26 21 - 32 mmol/L Anion gap 7 5 - 15 mmol/L Glucose 95 65 - 100 mg/dL BUN 14 6 - 20 MG/DL Creatinine 0.91 0.70 - 1.30 MG/DL  
 BUN/Creatinine ratio 15 12 - 20 GFR est AA >60 >60 ml/min/1.73m2 GFR est non-AA >60 >60 ml/min/1.73m2 Calcium 9.1 8.5 - 10.1 MG/DL Bilirubin, total 0.4 0.2 - 1.0 MG/DL  
 ALT (SGPT) 51 12 - 78 U/L  
 AST (SGOT) 29 15 - 37 U/L Alk. phosphatase 78 45 - 117 U/L Protein, total 7.3 6.4 - 8.2 g/dL Albumin 3.9 3.5 - 5.0 g/dL Globulin 3.4 2.0 - 4.0 g/dL A-G Ratio 1.1 1.1 - 2.2 NT-PRO BNP Collection Time: 01/19/19 12:15 AM  
Result Value Ref Range NT pro- (H) 0 - 125 PG/ML  
D DIMER Collection Time: 01/19/19  2:17 AM  
Result Value Ref Range D-dimer 0.27 0.00 - 0.65 mg/L FEU Radiology - The following have been ordered and reviewed: 
 
XR CHEST PA LAT Final Result IMPRESSION:  
NORMAL CHEST. CT Results  (Last 48 hours) None CXR Results  (Last 48 hours) 01/19/19 0027  XR CHEST PA LAT Final result Impression:  IMPRESSION:  
NORMAL CHEST. Narrative:  History: Chest pain. Frontal and lateral views of the chest show clear lungs. The heart, mediastinum  
and pulmonary vasculature are normal.  There are mild degenerative changes of  
the spine. Vital Signs - Reviewed the patient's vital signs. Patient Vitals for the past 12 hrs: 
 Temp Pulse Resp BP SpO2 01/18/19 2300 97.8 °F (36.6 °C) 81 18 (!) 155/98 99 % Medications Given in the ED: 
 
Medications  
methylPREDNISolone (PF) (Solu-MEDROL) 125 mg/2 mL injection (  Canceled Entry 1/19/19 0148)  
albuterol-ipratropium (DUO-NEB) 2.5 MG-0.5 MG/3 ML (3 mL Nebulization Given 1/19/19 0045) methylPREDNISolone (PF) (Solu-MEDROL) injection 125 mg (125 mg IntraVENous Given 1/19/19 0145) diazePAM (VALIUM) tablet 5 mg (5 mg Oral Given 1/19/19 0158)  
ketorolac (TORADOL) injection 15 mg (15 mg IntraVENous Given 1/19/19 0159)  
albuterol-ipratropium (DUO-NEB) 2.5 MG-0.5 MG/3 ML (3 mL Nebulization Given 1/19/19 0200) Diagnosis Clinical Impression: 1. Acute bronchitis, unspecified organism 2. Radiculopathy of cervicothoracic region 3. Tobacco dependence Plan: 1. Follow-up Information Follow up With Specialties Details Why Contact Info Faye Alvares MD Family Practice Schedule an appointment as soon as possible for a visit in 2 days  [address] [address]. [phone] Gianna Silva MD Pulmonary Disease Schedule an appointment as soon as possible for a visit in 2 days to discuss pulmonary function testing [address] [facility] [address] [facility] 83. [phone] 2. Current Discharge Medication List  
  
START taking these medications Details  
albuterol (PROVENTIL HFA, VENTOLIN HFA, PROAIR HFA) 90 mcg/actuation inhaler Take 2 Puffs by inhalation every four (4) hours as needed for Wheezing. Qty: 1 Inhaler, Refills: 0  
  
!! predniSONE (STERAPRED DS) 10 mg dose pack Take as directed on packaging 
Qty: 48 Tab, Refills: 0  
  
diazePAM (VALIUM) 5 mg tablet Take 1 Tab by mouth every eight (8) hours as needed (spasm). Max Daily Amount: 15 mg. 
Qty: 15 Tab, Refills: 0 Associated Diagnoses: Acute bronchitis, unspecified organism; Radiculopathy of cervicothoracic region doxycycline (VIBRA-TABS) 100 mg tablet Take 1 Tab by mouth two (2) times a day for 7 days. Qty: 14 Tab, Refills: 0  
  
 !! - Potential duplicate medications found. Please discuss with provider. CONTINUE these medications which have NOT CHANGED Details  
albuterol sulfate 90 mcg/actuation aepb Take 2 Puffs by inhalation every six (6) hours as needed. Qty: 2 Inhaler, Refills: 0  
  
!! predniSONE (STERAPRED DS) 10 mg dose pack Take as prescribed 
Qty: 21 Tab, Refills: 0  
  
 !! - Potential duplicate medications found. Please discuss with provider. Return to ED if Worse Disposition Note: 
 
3:20 AM  
The patient's results have been reviewed with family and/or caregiver. They verbally convey their understanding and agreement of the patient's signs, symptoms, diagnosis, treatment and prognosis and additionally agree to follow up as recommended in the discharge instructions or to return to the Emergency Room should the patient's condition change prior to their follow-up appointment. The family and/or caregiver verbally agrees with the care-plan and all of their questions have been answered. The discharge instructions have also been provided to the them with educational information regarding the patient's diagnosis as well a list of reasons why the patient would want to return to the ER prior to their follow-up appointment should their condition change. Chandu Ruggiero MD 
 
 
 
 
 
 
 
 
This note is prepared by Junie Newell, acting as Scribe for MD Chandu Peterson MD : The scribe's documentation has been prepared under my direction and personally reviewed by me in its entirety. I confirm that the note above accurately reflects all work, treatment, procedures, and medical decision making performed by me. This note will not be viewable in [address]

## 2019-01-19 NOTE — DISCHARGE INSTRUCTIONS
Patient Education        Bronchitis: Care Instructions  Your Care Instructions    Bronchitis is inflammation of the bronchial tubes, which carry air to the lungs. The tubes swell and produce mucus, or phlegm. The mucus and inflamed bronchial tubes make you cough. You may have trouble breathing. Most cases of bronchitis are caused by viruses like those that cause colds. Antibiotics usually do not help and they may be harmful. Bronchitis usually develops rapidly and lasts about 2 to 3 weeks in otherwise healthy people. Follow-up care is a key part of your treatment and safety. Be sure to make and go to all appointments, and call your doctor if you are having problems. It's also a good idea to know your test results and keep a list of the medicines you take. How can you care for yourself at home? · Take all medicines exactly as prescribed. Call your doctor if you think you are having a problem with your medicine. · Get some extra rest.  · Take an over-the-counter pain medicine, such as acetaminophen (Tylenol), ibuprofen (Advil, Motrin), or naproxen (Aleve) to reduce fever and relieve body aches. Read and follow all instructions on the label. · Do not take two or more pain medicines at the same time unless the doctor told you to. Many pain medicines have acetaminophen, which is Tylenol. Too much acetaminophen (Tylenol) can be harmful. · Take an over-the-counter cough medicine that contains dextromethorphan to help quiet a dry, hacking cough so that you can sleep. Avoid cough medicines that have more than one active ingredient. Read and follow all instructions on the label. · Breathe moist air from a humidifier, hot shower, or sink filled with hot water. The heat and moisture will thin mucus so you can cough it out. · Do not smoke. Smoking can make bronchitis worse. If you need help quitting, talk to your doctor about stop-smoking programs and medicines.  These can increase your chances of quitting for good.  When should you call for help? Call 911 anytime you think you may need emergency care. For example, call if:    · You have severe trouble breathing.    Call your doctor now or seek immediate medical care if:    · You have new or worse trouble breathing.     · You cough up dark brown or bloody mucus (sputum).     · You have a new or higher fever.     · You have a new rash.    Watch closely for changes in your health, and be sure to contact your doctor if:    · You cough more deeply or more often, especially if you notice more mucus or a change in the color of your mucus.     · You are not getting better as expected. Where can you learn more? Go to http://elsaAmiatonewton.info/. Enter H333 in the search box to learn more about \"Bronchitis: Care Instructions. \"  Current as of: September 5, 2018  Content Version: 11.9  © 4782-9052 [employer]. Care instructions adapted under license by [employer] (which disclaims liability or warranty for this information). If you have questions about a medical condition or this instruction, always ask your healthcare professional. [address] any warranty or liability for your use of this information. Patient Education        Pinched Nerve in the Neck: Care Instructions  Your Care Instructions  A pinched nerve in the neck happens when a vertebra or disc in the upper part of your spine is damaged. This damage can happen because of an injury. Or it can just happen with age. The changes caused by the damage may put pressure on a nearby nerve root, pinching it. This causes symptoms such as sharp pain in your neck, shoulder, arm, hand, or back. You may also have tingling or numbness. Sometimes it makes your arm weaker. The symptoms are usually worse when you turn your head or strain your neck. For many people, the symptoms get better over time and finally go away.   Early treatment usually includes medicines for pain and swelling. Sometimes physical therapy and special exercises may help. Follow-up care is a key part of your treatment and safety. Be sure to make and go to all appointments, and call your doctor if you are having problems. It's also a good idea to know your test results and keep a list of the medicines you take. How can you care for yourself at home? · Be safe with medicines. Read and follow all instructions on the label. ? If the doctor gave you a prescription medicine for pain, take it as prescribed. ? If you are not taking a prescription pain medicine, ask your doctor if you can take an over-the-counter medicine. · Try using a heating pad on a low or medium setting for 15 to 20 minutes every 2 or 3 hours. Try a warm shower in place of one session with the heating pad. You can also buy single-use heat wraps that last up to 8 hours. · You can also try an ice pack for 10 to 15 minutes every 2 to 3 hours. There isn't strong evidence that either heat or ice will help. But you can try them to see if they help you. · Don't spend too long in one position. Take short breaks to move around and change positions. · Wear a seat belt and shoulder harness when you are in a car. · Sleep with a pillow under your head and neck that keeps your neck straight. · If you were given a neck brace (cervical collar) to limit neck motion, wear it as instructed for as many days as your doctor tells you to. Do not wear it longer than you were told to. Wearing a brace for too long can lead to neck stiffness and can weaken the neck muscles. · Follow your doctor's instructions for gentle neck-stretching exercises. · Do not smoke. Smoking can slow healing of your discs. If you need help quitting, talk to your doctor about stop-smoking programs and medicines. These can increase your chances of quitting for good. · Avoid strenuous work or exercise until your doctor says it is okay. When should you call for help?   Call [phone] anytime you think you may need emergency care. For example, call if:    · You are unable to move an arm or a leg at all.   [facility] your doctor now or seek immediate medical care if:    · You have new or worse symptoms in your arms, legs, chest, belly, or buttocks. Symptoms may include:  ? Numbness or tingling. ? Weakness. ? Pain.     · You lose bladder or bowel control.    Watch closely for changes in your health, and be sure to contact your doctor if:    · You are not getting better as expected. Where can you learn more? Go to http://elsa-newton.info/. Enter H660 in the search box to learn more about \"Pinched Nerve in the Neck: Care Instructions. \"  Current as of: September 20, 2018  Content Version: 11.9  © 6502-6359 [employer]. Care instructions adapted under license by [employer] (which disclaims liability or warranty for this information). If you have questions about a medical condition or this instruction, always ask your healthcare professional. [place] [address] any warranty or liability for your use of this information. Patient Education        Healthy Upper Back: Exercises  Your Care Instructions  Here are some examples of exercises for your upper back. Start each exercise slowly. Ease off the exercise if you start to have pain. Your doctor or physical therapist will tell you when you can start these exercises and which ones will work best for you. How to do the exercises  Lower neck and upper back stretch    1. Stretch your arms out in front of your body. Clasp one hand on top of your other hand. 2. Gently reach out so that you feel your shoulder blades stretching away from each other. 3. Gently bend your head forward. 4. Hold for 15 to 30 seconds. 5. Repeat 2 to 4 times. Midback stretch    1. Kneel on the floor, and sit back on your ankles.   2. Lean forward, place your hands on the floor, and stretch your arms out in front of you. Rest your head between your arms. 3. Gently push your chest toward the floor, reaching as far in front of you as possible. 4. Hold for 15 to 30 seconds. 5. Repeat 2 to 4 times. Shoulder rolls    1. Sit comfortably with your feet shoulder-width apart. You can also do this exercise while standing. 2. Roll your shoulders up, then back, and then down in a smooth, circular motion. 3. Repeat 2 to 4 times. Wall push-up    1. Stand against a wall with your feet about 12 to 24 inches back from the wall. If you feel any pain when you do this exercise, stand closer to the wall. 2. Place your hands on the wall slightly wider apart than your shoulders, and lean forward. 3. Gently lean your body toward the wall. Then push back to your starting position. Keep the motion smooth and controlled. 4. Repeat 8 to 12 times. Resisted shoulder blade squeeze    1. Sit or stand, holding the band in both hands in front of you. Keep your elbows close to your sides, bent at a 90-degree angle. Your palms should face up. 2. Squeeze your shoulder blades together, and move your arms to the outside, stretching the band. Be sure to keep your elbows at your sides while you do this. 3. Relax. 4. Repeat 8 to 12 times. Resisted rows    1. Put the band around a solid object, such as a bedpost, at about waist level. Hold one end of the band in each hand. 2. With your elbows at your sides and bent to 90 degrees, pull the band back to move your shoulder blades toward each other. Return to the starting position. 3. Repeat 8 to 12 times. Follow-up care is a key part of your treatment and safety. Be sure to make and go to all appointments, and call your doctor if you are having problems. It's also a good idea to know your test results and keep a list of the medicines you take. Where can you learn more? Go to http://elsa-newton.info/.   Enter K186 in the search box to learn more about \"Healthy Upper Back: Exercises. \"  Current as of: September 20, 2018  Content Version: 11.9  © 2389-8394 [employer]. Care instructions adapted under license by [employer] (which disclaims liability or warranty for this information). If you have questions about a medical condition or this instruction, always ask your healthcare professional. [address] any warranty or liability for your use of this information.       Chronic Obstructive Pulmonary Disease (COPD): Care Instructions  Your Care Instructions    Chronic obstructive pulmonary disease (COPD) is a general term for a group of lung diseases, including emphysema and chronic bronchitis. People with COPD have decreased airflow in and out of the lungs, which makes it hard to breathe. The airways also can get clogged with thick mucus. Cigarette smoking is a major cause of COPD. Although there is no cure for COPD, you can slow its progress. Following your treatment plan and taking care of yourself can help you feel better and live longer. Follow-up care is a key part of your treatment and safety. Be sure to make and go to all appointments, and call your doctor if you are having problems. It's also a good idea to know your test results and keep a list of the medicines you take. How can you care for yourself at home?   Staying healthy    · Do not smoke. This is the most important step you can take to prevent more damage to your lungs. If you need help quitting, talk to your doctor about stop-smoking programs and medicines. These can increase your chances of quitting for good.     · Avoid colds and flu. Get a pneumococcal vaccine shot. If you have had one before, ask your doctor whether you need a second dose. Get the flu vaccine every fall. If you must be around people with colds or the flu, wash your hands often.     · Avoid secondhand smoke, air pollution, and high altitudes. Also avoid cold, dry air and hot, humid air.  Stay at home with your windows closed when air pollution is bad.    Medicines and oxygen therapy    · Take your medicines exactly as prescribed. Call your doctor if you think you are having a problem with your medicine.     · You may be taking medicines such as:  ? Bronchodilators. These help open your airways and make breathing easier. Bronchodilators are either short-acting (work for 6 to 9 hours) or long-acting (work for 24 hours). You inhale most bronchodilators, so they start to act quickly. Always carry your quick-relief inhaler with you in case you need it while you are away from home. ? Corticosteroids (prednisone, budesonide). These reduce airway inflammation. They come in pill or inhaled form. You must take these medicines every day for them to work well.     · A spacer may help you get more inhaled medicine to your lungs. Ask your doctor or pharmacist if a spacer is right for you. If it is, ask how to use it properly.     · Do not take any vitamins, over-the-counter medicine, or herbal products without talking to your doctor first.     · If your doctor prescribed antibiotics, take them as directed. Do not stop taking them just because you feel better. You need to take the full course of antibiotics.     · Oxygen therapy boosts the amount of oxygen in your blood and helps you breathe easier. Use the flow rate your doctor has recommended, and do not change it without talking to your doctor first.   Activity    · Get regular exercise. Walking is an easy way to get exercise. Start out slowly, and walk a little more each day.     · Pay attention to your breathing.  You are exercising too hard if you cannot talk while you are exercising.     · Take short rest breaks when doing household chores and other activities.     · Learn breathing methods--such as breathing through pursed lips--to help you become less short of breath.     · If your doctor has not set you up with a pulmonary rehabilitation program, talk to him or her about whether rehab is right for you. Rehab includes exercise programs, education about your disease and how to manage it, help with diet and other changes, and emotional support. Diet    · Eat regular, healthy meals. Use bronchodilators about 1 hour before you eat to make it easier to eat. Eat several small meals instead of three large ones. Drink beverages at the end of the meal. Avoid foods that are hard to chew.     · Eat foods that contain protein so that you do not lose muscle mass.     · Talk with your doctor if you gain too much weight or if you lose weight without trying.    Mental health    · Talk to your family, friends, or a therapist about your feelings. It is normal to feel frightened, angry, hopeless, helpless, and even guilty. Talking openly about bad feelings can help you cope. If these feelings last, talk to your doctor. When should you call for help? Call 911 anytime you think you may need emergency care. For example, call if:    · You have severe trouble breathing.    Call your doctor now or seek immediate medical care if:    · You have new or worse trouble breathing.     · You cough up blood.     · You have a fever.    Watch closely for changes in your health, and be sure to contact your doctor if:    · You cough more deeply or more often, especially if you notice more mucus or a change in the color of your mucus.     · You have new or worse swelling in your legs or belly.     · You are not getting better as expected. Where can you learn more? Go to http://elsa-newton.info/. Hernan Chicas in the search box to learn more about \"Chronic Obstructive Pulmonary Disease (COPD): Care Instructions. \"  Current as of: September 5, 2018  Content Version: 11.9  © 3307-9785 [email or web address]. Care instructions adapted under license by [employer] (which disclaims liability or warranty for this information).  If you have questions about a medical condition or this instruction, always ask your healthcare professional. [place] [address] any warranty or liability for your use of this information.

## 2019-03-31 ENCOUNTER — HOSPITAL ENCOUNTER (OUTPATIENT)
Dept: MRI IMAGING | Age: 59
Discharge: HOME | End: 2019-03-31
Attending: ORTHOPAEDIC SURGERY
Payer: MEDICARE

## 2019-03-31 DIAGNOSIS — M54.12 CERVICAL RADICULOPATHY: ICD-10-CM

## 2019-03-31 PROCEDURE — 72141 MRI NECK SPINE W/O DYE: CPT

## 2024-08-26 SDOH — HEALTH STABILITY: PHYSICAL HEALTH: ON AVERAGE, HOW MANY MINUTES DO YOU ENGAGE IN EXERCISE AT THIS LEVEL?: 30 MIN

## 2024-08-26 SDOH — HEALTH STABILITY: PHYSICAL HEALTH: ON AVERAGE, HOW MANY DAYS PER WEEK DO YOU ENGAGE IN MODERATE TO STRENUOUS EXERCISE (LIKE A BRISK WALK)?: 7 DAYS

## 2024-08-27 ENCOUNTER — OFFICE VISIT (OUTPATIENT)
Age: 64
End: 2024-08-27
Payer: MEDICARE

## 2024-08-27 VITALS
BODY MASS INDEX: 28.26 KG/M2 | HEART RATE: 78 BPM | OXYGEN SATURATION: 95 % | RESPIRATION RATE: 16 BRPM | DIASTOLIC BLOOD PRESSURE: 70 MMHG | WEIGHT: 197.4 LBS | HEIGHT: 70 IN | TEMPERATURE: 97.9 F | SYSTOLIC BLOOD PRESSURE: 170 MMHG

## 2024-08-27 DIAGNOSIS — R03.0 ELEVATED BP WITHOUT DIAGNOSIS OF HYPERTENSION: Primary | ICD-10-CM

## 2024-08-27 DIAGNOSIS — Z87.898 HISTORY OF WHEEZING: ICD-10-CM

## 2024-08-27 DIAGNOSIS — F17.200 CURRENT SMOKER: ICD-10-CM

## 2024-08-27 DIAGNOSIS — Z51.81 ENCOUNTER FOR MONITORING CHRONIC NSAID THERAPY: ICD-10-CM

## 2024-08-27 DIAGNOSIS — Z00.00 WELL ADULT HEALTH CHECK: ICD-10-CM

## 2024-08-27 DIAGNOSIS — Z87.09 HISTORY OF COPD: ICD-10-CM

## 2024-08-27 DIAGNOSIS — Z79.1 ENCOUNTER FOR MONITORING CHRONIC NSAID THERAPY: ICD-10-CM

## 2024-08-27 DIAGNOSIS — R23.3 EASY BRUISING: ICD-10-CM

## 2024-08-27 PROCEDURE — 99204 OFFICE O/P NEW MOD 45 MIN: CPT | Performed by: INTERNAL MEDICINE

## 2024-08-27 PROCEDURE — 3017F COLORECTAL CA SCREEN DOC REV: CPT | Performed by: INTERNAL MEDICINE

## 2024-08-27 PROCEDURE — 4004F PT TOBACCO SCREEN RCVD TLK: CPT | Performed by: INTERNAL MEDICINE

## 2024-08-27 PROCEDURE — 1123F ACP DISCUSS/DSCN MKR DOCD: CPT | Performed by: INTERNAL MEDICINE

## 2024-08-27 PROCEDURE — G8419 CALC BMI OUT NRM PARAM NOF/U: HCPCS | Performed by: INTERNAL MEDICINE

## 2024-08-27 PROCEDURE — G8427 DOCREV CUR MEDS BY ELIG CLIN: HCPCS | Performed by: INTERNAL MEDICINE

## 2024-08-27 RX ORDER — LORATADINE 10 MG/1
10 TABLET ORAL DAILY
COMMUNITY

## 2024-08-27 RX ORDER — HYDROCODONE BITARTRATE AND ACETAMINOPHEN 5; 325 MG/1; MG/1
1 TABLET ORAL EVERY 8 HOURS PRN
Status: CANCELLED | OUTPATIENT
Start: 2024-08-27

## 2024-08-27 RX ORDER — ALBUTEROL SULFATE 90 UG/1
2 AEROSOL, METERED RESPIRATORY (INHALATION) EVERY 4 HOURS PRN
Qty: 8 G | Refills: 2 | Status: SHIPPED | OUTPATIENT
Start: 2024-08-27

## 2024-08-27 RX ORDER — GABAPENTIN 300 MG/1
300 CAPSULE ORAL 3 TIMES DAILY
COMMUNITY
Start: 2024-05-02 | End: 2024-10-29

## 2024-08-27 RX ORDER — FLUTICASONE FUROATE 200 UG/1
200 POWDER RESPIRATORY (INHALATION) DAILY
Qty: 30 EACH | Refills: 3 | Status: SHIPPED | OUTPATIENT
Start: 2024-08-27

## 2024-08-27 RX ORDER — HYDROCODONE BITARTRATE AND ACETAMINOPHEN 5; 325 MG/1; MG/1
1 TABLET ORAL EVERY 8 HOURS PRN
COMMUNITY
Start: 2021-04-14

## 2024-08-27 RX ORDER — MELOXICAM 15 MG/1
15 TABLET ORAL DAILY
COMMUNITY
Start: 2023-08-30

## 2024-08-27 RX ORDER — ALBUTEROL SULFATE 90 UG/1
2 AEROSOL, METERED RESPIRATORY (INHALATION) EVERY 4 HOURS PRN
COMMUNITY
Start: 2019-01-19 | End: 2024-08-27

## 2024-08-27 RX ORDER — ALBUTEROL SULFATE 0.83 MG/ML
2.5 SOLUTION RESPIRATORY (INHALATION) 4 TIMES DAILY PRN
Qty: 25 EACH | Refills: 3 | Status: SHIPPED | OUTPATIENT
Start: 2024-08-27

## 2024-08-27 RX ORDER — MELOXICAM 15 MG/1
15 TABLET ORAL DAILY
Qty: 30 TABLET | Status: CANCELLED | OUTPATIENT
Start: 2024-08-27

## 2024-08-27 RX ORDER — GABAPENTIN 300 MG/1
300 CAPSULE ORAL 3 TIMES DAILY
Qty: 90 CAPSULE | Refills: 5 | Status: CANCELLED | OUTPATIENT
Start: 2024-08-27 | End: 2025-02-23

## 2024-08-27 SDOH — ECONOMIC STABILITY: FOOD INSECURITY: WITHIN THE PAST 12 MONTHS, THE FOOD YOU BOUGHT JUST DIDN'T LAST AND YOU DIDN'T HAVE MONEY TO GET MORE.: NEVER TRUE

## 2024-08-27 SDOH — ECONOMIC STABILITY: FOOD INSECURITY: WITHIN THE PAST 12 MONTHS, YOU WORRIED THAT YOUR FOOD WOULD RUN OUT BEFORE YOU GOT MONEY TO BUY MORE.: NEVER TRUE

## 2024-08-27 SDOH — ECONOMIC STABILITY: INCOME INSECURITY: HOW HARD IS IT FOR YOU TO PAY FOR THE VERY BASICS LIKE FOOD, HOUSING, MEDICAL CARE, AND HEATING?: NOT HARD AT ALL

## 2024-08-27 ASSESSMENT — PATIENT HEALTH QUESTIONNAIRE - PHQ9
1. LITTLE INTEREST OR PLEASURE IN DOING THINGS: NOT AT ALL
SUM OF ALL RESPONSES TO PHQ9 QUESTIONS 1 & 2: 0
SUM OF ALL RESPONSES TO PHQ QUESTIONS 1-9: 0
2. FEELING DOWN, DEPRESSED OR HOPELESS: NOT AT ALL
SUM OF ALL RESPONSES TO PHQ QUESTIONS 1-9: 0

## 2024-08-27 NOTE — PROGRESS NOTES
Leonel Cr (: 1960) is a 64 y.o. male, new patient, here for evaluation of the following chief complaint(s):  Chief Complaint   Patient presents with    New Patient       Assessment and Plan:      Diagnosis Orders   1. Elevated BP without diagnosis of hypertension        2. Easy bruising  CBC with Auto Differential    APTT    Protime-INR    Protime-INR    APTT    CBC with Auto Differential      3. Encounter for monitoring chronic NSAID therapy  CBC with Auto Differential    Comprehensive Metabolic Panel    Comprehensive Metabolic Panel    CBC with Auto Differential      4. Well adult health check  Hemoglobin A1C    Hemoglobin A1C      5. History of wheezing  fluticasone (ARNUITY ELLIPTA) 200 MCG/ACT AEPB    albuterol sulfate HFA (VENTOLIN HFA) 108 (90 Base) MCG/ACT inhaler    albuterol (PROVENTIL) (2.5 MG/3ML) 0.083% nebulizer solution    Octavio Hoffman MD, Pulmonology, [place]      6. Current smoker  fluticasone (ARNUITY ELLIPTA) 200 MCG/ACT AEPB    albuterol sulfate HFA (VENTOLIN HFA) 108 (90 Base) MCG/ACT inhaler    albuterol (PROVENTIL) (2.5 MG/3ML) 0.083% nebulizer solution    Octavio Hoffman MD, Pulmonology, [place]      7. History of COPD  fluticasone (ARNUITY ELLIPTA) 200 MCG/ACT AEPB    albuterol sulfate HFA (VENTOLIN HFA) 108 (90 Base) MCG/ACT inhaler    albuterol (PROVENTIL) (2.5 MG/3ML) 0.083% nebulizer solution    Octavio Hoffman MD, Pulmonology, [place]          1-4:  Lab evaluation reviewed.    4:  Lab screenings reviewed as above.    5-7:  Medication(s), management and follow-up based on response reviewed at visit.  Referral(s) and referral coordination reviewed with patient at visit.    Inhaler use for rescue and controllers reviewed at visit.  Trial ICS as above pending pulmonary evaluation reviewed.      Return in about 2 weeks (around 9/10/2024) for Blood Pressure follow-up--nurse visit or BP or follow-up with me.  lab results and schedule of future lab

## 2024-08-27 NOTE — PROGRESS NOTES
RM: 16  Chief Complaint   Patient presents with    New Patient      Vitals:    08/27/24 1459   BP: (!) 179/82   Pulse: 78   Resp: 16   Temp: 97.9 °F (36.6 °C)   SpO2: 95%      FASTING: No  Have you been to the ER, urgent care clinic since your last visit?  Hospitalized since your last visit?\"    NO  “Have you seen or consulted any other health care providers outside of [facility] since your last visit?”    NO    Click Here for Release of Records Request

## 2024-08-28 LAB
ALBUMIN SERPL-MCNC: 3.8 G/DL (ref 3.5–5)
ALBUMIN/GLOB SERPL: 1.4 (ref 1.1–2.2)
ALP SERPL-CCNC: 93 U/L (ref 45–117)
ALT SERPL-CCNC: 30 U/L (ref 12–78)
ANION GAP SERPL CALC-SCNC: 2 MMOL/L (ref 5–15)
APTT PPP: 26.7 SEC (ref 22.1–31)
AST SERPL-CCNC: 20 U/L (ref 15–37)
BASOPHILS # BLD: 0 K/UL (ref 0–0.1)
BASOPHILS NFR BLD: 1 % (ref 0–1)
BILIRUB SERPL-MCNC: 0.4 MG/DL (ref 0.2–1)
BUN SERPL-MCNC: 22 MG/DL (ref 6–20)
BUN/CREAT SERPL: 22 (ref 12–20)
CALCIUM SERPL-MCNC: 9.2 MG/DL (ref 8.5–10.1)
CHLORIDE SERPL-SCNC: 110 MMOL/L (ref 97–108)
CO2 SERPL-SCNC: 30 MMOL/L (ref 21–32)
CREAT SERPL-MCNC: 0.98 MG/DL (ref 0.7–1.3)
DIFFERENTIAL METHOD BLD: ABNORMAL
EOSINOPHIL # BLD: 0.2 K/UL (ref 0–0.4)
EOSINOPHIL NFR BLD: 3 % (ref 0–7)
ERYTHROCYTE [DISTWIDTH] IN BLOOD BY AUTOMATED COUNT: 13.2 % (ref 11.5–14.5)
EST. AVERAGE GLUCOSE BLD GHB EST-MCNC: 111 MG/DL
GLOBULIN SER CALC-MCNC: 2.8 G/DL (ref 2–4)
GLUCOSE SERPL-MCNC: 117 MG/DL (ref 65–100)
HBA1C MFR BLD: 5.5 % (ref 4–5.6)
HCT VFR BLD AUTO: 47 % (ref 36.6–50.3)
HGB BLD-MCNC: 15.7 G/DL (ref 12.1–17)
IMM GRANULOCYTES # BLD AUTO: 0 K/UL (ref 0–0.04)
IMM GRANULOCYTES NFR BLD AUTO: 0 % (ref 0–0.5)
INR PPP: 0.9 (ref 0.9–1.1)
LYMPHOCYTES # BLD: 2.3 K/UL (ref 0.8–3.5)
LYMPHOCYTES NFR BLD: 35 % (ref 12–49)
MCH RBC QN AUTO: 33.5 PG (ref 26–34)
MCHC RBC AUTO-ENTMCNC: 33.4 G/DL (ref 30–36.5)
MCV RBC AUTO: 100.4 FL (ref 80–99)
MONOCYTES # BLD: 0.7 K/UL (ref 0–1)
MONOCYTES NFR BLD: 10 % (ref 5–13)
NEUTS SEG # BLD: 3.3 K/UL (ref 1.8–8)
NEUTS SEG NFR BLD: 51 % (ref 32–75)
NRBC # BLD: 0 K/UL (ref 0–0.01)
NRBC BLD-RTO: 0 PER 100 WBC
PLATELET # BLD AUTO: 213 K/UL (ref 150–400)
PMV BLD AUTO: 10.7 FL (ref 8.9–12.9)
POTASSIUM SERPL-SCNC: 3.9 MMOL/L (ref 3.5–5.1)
PROT SERPL-MCNC: 6.6 G/DL (ref 6.4–8.2)
PROTHROMBIN TIME: 9.8 SEC (ref 9–11.1)
RBC # BLD AUTO: 4.68 M/UL (ref 4.1–5.7)
SODIUM SERPL-SCNC: 142 MMOL/L (ref 136–145)
THERAPEUTIC RANGE: NORMAL SECS (ref 58–77)
WBC # BLD AUTO: 6.5 K/UL (ref 4.1–11.1)

## 2024-09-04 ENCOUNTER — NURSE ONLY (OUTPATIENT)
Age: 64
End: 2024-09-04

## 2024-09-04 VITALS — SYSTOLIC BLOOD PRESSURE: 163 MMHG | DIASTOLIC BLOOD PRESSURE: 82 MMHG

## 2024-09-04 DIAGNOSIS — Z01.30 BP CHECK: Primary | ICD-10-CM

## 2024-09-04 NOTE — PROGRESS NOTES
Pt came in today for a nurse visit for his BP check and the reading was   Vitals:    09/04/24 1505   BP: (!) 163/82      And he has an appointment with his PULMONOLOGY doctor on 09/25/24 at 3 pm

## 2024-10-04 ENCOUNTER — TELEPHONE (OUTPATIENT)
Age: 64
End: 2024-10-04

## 2024-10-04 NOTE — TELEPHONE ENCOUNTER
Patient wife has called requesting a referral to see a cardiologist. Patient is requesting to see Francisco Linton

## 2024-10-25 DIAGNOSIS — Z87.898 HISTORY OF WHEEZING: ICD-10-CM

## 2024-10-25 DIAGNOSIS — Z87.09 HISTORY OF COPD: ICD-10-CM

## 2024-10-25 DIAGNOSIS — F17.200 CURRENT SMOKER: ICD-10-CM

## 2024-10-25 RX ORDER — FLUTICASONE FUROATE 200 UG/1
POWDER RESPIRATORY (INHALATION)
Qty: 90 EACH | Refills: 0 | OUTPATIENT
Start: 2024-10-25

## 2024-10-25 NOTE — TELEPHONE ENCOUNTER
Duplicate request: Too soon   08/27/2024 Arnuity Ellipta #30 with 3 refill was sent to [facility].     For Pharmacy Admin Tracking Only    Program: Medication Refill  Intervention Detail: Discontinued Rx: 1, reason: Duplicate Therapy  Time Spent (min): 5    Requested Prescriptions     Pending Prescriptions Disp Refills    fluticasone (ARNUITY ELLIPTA) 200 MCG/ACT AEPB [Pharmacy Med Name: Arnuity Ellipta Inhalation Aerosol Powder Breath Activated 200 MCG/ACT] 90 each 0     Sig: INHALE 1 PUFF EVERY DAY

## 2024-11-05 DIAGNOSIS — K21.9 GASTROESOPHAGEAL REFLUX DISEASE WITHOUT ESOPHAGITIS: Primary | ICD-10-CM

## 2024-11-05 NOTE — TELEPHONE ENCOUNTER
Pt established care with Dr. BARRY Anderson on 08/27/2024.     [facility] is requesting a new prescription for the Prilosec 20 mg caps.     Last appointment: 08/27/2024 MD Anderson   Next appointment: 11/15/2024 MD Anderson     For Pharmacy Admin Tracking Only    Program: Medication Refill  Intervention Detail: New Rx: 1, reason: Patient Preference  Time Spent (min): 5    Requested Prescriptions     Pending Prescriptions Disp Refills    omeprazole (PRILOSEC) 20 MG delayed release capsule [Pharmacy Med Name: OMEPRAZOLE DR 20MG CAP] 90 capsule 0     Sig: Take 1 capsule by mouth Daily

## 2024-11-06 NOTE — TELEPHONE ENCOUNTER
Requested Prescriptions     Pending Prescriptions Disp Refills    loratadine-pseudoephedrine (SM LORATA-DINE D)  MG per extended release tablet [Pharmacy Med Name: LORATADINE/PSE ER 10-240MG TAB] 45 tablet 0    omeprazole (PRILOSEC) 20 MG delayed release capsule [Pharmacy Med Name: OMEPRAZOLE DR 20MG CAP] 90 capsule 0        Next appt: 11/15/24

## 2024-11-08 RX ORDER — LORATADINE 10 MG/1
10 TABLET ORAL DAILY
Status: CANCELLED | OUTPATIENT
Start: 2024-11-08

## 2024-11-09 RX ORDER — LORATADINE AND PSEUDOEPHEDRINE SULFATE 10; 240 MG/1; MG/1
TABLET, FILM COATED, EXTENDED RELEASE ORAL
Qty: 45 TABLET | Refills: 0 | OUTPATIENT
Start: 2024-11-09

## 2024-11-09 RX ORDER — LORATADINE 10 MG/1
10 TABLET ORAL DAILY
Qty: 90 TABLET | Refills: 1 | Status: SHIPPED | OUTPATIENT
Start: 2024-11-09

## 2024-11-11 DIAGNOSIS — F17.200 CURRENT SMOKER: ICD-10-CM

## 2024-11-11 DIAGNOSIS — Z87.09 HISTORY OF COPD: ICD-10-CM

## 2024-11-11 DIAGNOSIS — Z87.898 HISTORY OF WHEEZING: ICD-10-CM

## 2024-11-11 NOTE — TELEPHONE ENCOUNTER
Pt established care with Dr. BARRY Anderson on 08/27/2024.     Last appointment: 08/27/2024 MD Andesron   Next appointment: 11/15/2024 MD Anderson   Previous refill encounter(s):   08/27/2024 Arnuity Ellipta #30 each with 3 refills.     For Pharmacy Admin Tracking Only    Program: Medication Refill  Intervention Detail: New Rx: 1, reason: Patient Preference  Time Spent (min): 5    Requested Prescriptions     Pending Prescriptions Disp Refills    fluticasone (ARNUITY ELLIPTA) 200 MCG/ACT AEPB [Pharmacy Med Name: Arnuity Ellipta Inhalation Aerosol Powder Breath Activated 200 MCG/ACT] 90 each 0     Sig: INHALE 1 PUFF EVERY DAY

## 2024-11-14 NOTE — TELEPHONE ENCOUNTER
Duplicate request--omeprazole refilled 11-9-24 to mail-order pharmacy and Receipt electronically verified by pharmacy.

## 2024-11-15 ENCOUNTER — OFFICE VISIT (OUTPATIENT)
Age: 64
End: 2024-11-15
Payer: MEDICARE

## 2024-11-15 VITALS
SYSTOLIC BLOOD PRESSURE: 171 MMHG | OXYGEN SATURATION: 96 % | BODY MASS INDEX: 29.32 KG/M2 | RESPIRATION RATE: 18 BRPM | TEMPERATURE: 97.9 F | HEART RATE: 84 BPM | WEIGHT: 204.8 LBS | HEIGHT: 70 IN | DIASTOLIC BLOOD PRESSURE: 84 MMHG

## 2024-11-15 DIAGNOSIS — F17.200 CURRENT SMOKER: ICD-10-CM

## 2024-11-15 DIAGNOSIS — Z87.09 HISTORY OF COPD: ICD-10-CM

## 2024-11-15 DIAGNOSIS — Z87.898 HISTORY OF WHEEZING: ICD-10-CM

## 2024-11-15 DIAGNOSIS — R03.0 ELEVATED BP WITHOUT DIAGNOSIS OF HYPERTENSION: Primary | ICD-10-CM

## 2024-11-15 PROCEDURE — 99214 OFFICE O/P EST MOD 30 MIN: CPT | Performed by: INTERNAL MEDICINE

## 2024-11-15 PROCEDURE — G8419 CALC BMI OUT NRM PARAM NOF/U: HCPCS | Performed by: INTERNAL MEDICINE

## 2024-11-15 PROCEDURE — G8427 DOCREV CUR MEDS BY ELIG CLIN: HCPCS | Performed by: INTERNAL MEDICINE

## 2024-11-15 PROCEDURE — 3017F COLORECTAL CA SCREEN DOC REV: CPT | Performed by: INTERNAL MEDICINE

## 2024-11-15 PROCEDURE — G8484 FLU IMMUNIZE NO ADMIN: HCPCS | Performed by: INTERNAL MEDICINE

## 2024-11-15 PROCEDURE — 1123F ACP DISCUSS/DSCN MKR DOCD: CPT | Performed by: INTERNAL MEDICINE

## 2024-11-15 PROCEDURE — 4004F PT TOBACCO SCREEN RCVD TLK: CPT | Performed by: INTERNAL MEDICINE

## 2024-11-15 RX ORDER — FLUTICASONE FUROATE 200 UG/1
200 POWDER RESPIRATORY (INHALATION) DAILY
Qty: 90 EACH | Refills: 1 | Status: SHIPPED | OUTPATIENT
Start: 2024-11-15

## 2024-11-15 RX ORDER — FLUTICASONE FUROATE 200 UG/1
POWDER RESPIRATORY (INHALATION)
Qty: 90 EACH | Refills: 0 | OUTPATIENT
Start: 2024-11-15

## 2024-11-15 SDOH — ECONOMIC STABILITY: FOOD INSECURITY: WITHIN THE PAST 12 MONTHS, YOU WORRIED THAT YOUR FOOD WOULD RUN OUT BEFORE YOU GOT MONEY TO BUY MORE.: NEVER TRUE

## 2024-11-15 SDOH — ECONOMIC STABILITY: INCOME INSECURITY: HOW HARD IS IT FOR YOU TO PAY FOR THE VERY BASICS LIKE FOOD, HOUSING, MEDICAL CARE, AND HEATING?: NOT HARD AT ALL

## 2024-11-15 SDOH — ECONOMIC STABILITY: FOOD INSECURITY: WITHIN THE PAST 12 MONTHS, THE FOOD YOU BOUGHT JUST DIDN'T LAST AND YOU DIDN'T HAVE MONEY TO GET MORE.: NEVER TRUE

## 2024-11-15 ASSESSMENT — PATIENT HEALTH QUESTIONNAIRE - PHQ9
SUM OF ALL RESPONSES TO PHQ9 QUESTIONS 1 & 2: 0
2. FEELING DOWN, DEPRESSED OR HOPELESS: NOT AT ALL
SUM OF ALL RESPONSES TO PHQ QUESTIONS 1-9: 0
1. LITTLE INTEREST OR PLEASURE IN DOING THINGS: NOT AT ALL

## 2024-11-15 NOTE — PROGRESS NOTES
RM: 17  Chief Complaint   Patient presents with    Follow-up     BP follow up      Vitals:    11/15/24 1444   BP: (!) 179/94   Pulse: 84   Resp: 18   Temp: 97.9 °F (36.6 °C)   SpO2: 96%      FASTING: No  Have you been to the ER, urgent care clinic since your last visit?  Hospitalized since your last visit?\"    NO  “Have you seen or consulted any other health care providers outside of [facility] since your last visit?”    NO    Click Here for Release of Records Request   
10th.        With repeat on right just above 140, p      Nursing screenings reviewed by provider at visit.       Prior to Admission medications    Medication Sig Start Date End Date Taking? Authorizing Provider   omeprazole (PRILOSEC) 20 MG delayed release capsule Take 1 capsule by mouth Daily 11/9/24  Yes Barrington Anderson MD   loratadine (CLARITIN) 10 MG tablet Take 1 tablet by mouth daily 11/9/24  Yes Barrington Anderson MD   HYDROcodone-acetaminophen (NORCO) 5-325 MG per tablet Take 1 tablet by mouth every 8 hours as needed for Pain. 4/14/21  Yes Jude King MD   meloxicam (MOBIC) 15 MG tablet Take 1 tablet by mouth daily 8/30/23  Yes Jude King MD   Nebulizers MISC by Does not apply route   Yes Jude King MD   fluticasone (ARNUITY ELLIPTA) 200 MCG/ACT AEPB Inhale 200 mcg into the lungs daily 8/27/24  Yes Barrington Anderson MD   albuterol sulfate HFA (VENTOLIN HFA) 108 (90 Base) MCG/ACT inhaler Inhale 2 puffs into the lungs every 4 hours as needed (Cough) Use prior to bed and in AM as directed/needed. 8/27/24  Yes Barrington Anderson MD   albuterol (PROVENTIL) (2.5 MG/3ML) 0.083% nebulizer solution Take 3 mLs by nebulization 4 times daily as needed for Wheezing 8/27/24  Yes Barrington Anderson MD   gabapentin (NEURONTIN) 300 MG capsule Take 1 capsule by mouth 3 times daily. 5/2/24 10/29/24  ProviderJude MD      Refilled Arnuity for pt as requested.  There was a script for Dr. Muniz to refill, but cancelled with refill today as reviewed.    He had used hydrocodone with Dr. Lantigua with ortho in past.  He has not refilled with ortho or Dr. Hernandez, but will address refill hydrocodone there or with Ortho when needed.    He notes with Dr. Lantigua with ortho--retired--he got #80 and lasted 1.5yrs with 8 left.      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      Vitals:    11/15/24 1444 11/15/24 1539 11/15/24 1541   BP:

## 2024-11-15 NOTE — PATIENT INSTRUCTIONS
Vitals:    11/15/24 1444 11/15/24 1539 11/15/24 1541   BP: (!) 179/94 (!) 143/72 (!) 171/84   Site: Left Upper Arm Right Upper Arm Left Upper Arm   Position: Sitting Sitting Sitting   Cuff Size: Large Adult Medium Adult Medium Adult   Pulse: 84     Resp: 18     Temp: 97.9 °F (36.6 °C)     TempSrc: Oral     SpO2: 96%     Weight: 92.9 kg (204 lb 12.8 oz)     Height: 1.778 m (5' 10\")

## 2025-03-20 ENCOUNTER — OFFICE VISIT (OUTPATIENT)
Age: 65
End: 2025-03-20

## 2025-03-20 VITALS
HEART RATE: 86 BPM | BODY MASS INDEX: 27.84 KG/M2 | OXYGEN SATURATION: 96 % | DIASTOLIC BLOOD PRESSURE: 80 MMHG | TEMPERATURE: 97.8 F | WEIGHT: 194 LBS | SYSTOLIC BLOOD PRESSURE: 164 MMHG | RESPIRATION RATE: 16 BRPM

## 2025-03-20 DIAGNOSIS — R05.9 COUGH, UNSPECIFIED TYPE: ICD-10-CM

## 2025-03-20 DIAGNOSIS — R06.00 DYSPNEA, UNSPECIFIED TYPE: ICD-10-CM

## 2025-03-20 DIAGNOSIS — R06.2 WHEEZING: ICD-10-CM

## 2025-03-20 DIAGNOSIS — J44.1 COPD EXACERBATION (HCC): Primary | ICD-10-CM

## 2025-03-20 RX ORDER — PREDNISONE 20 MG/1
40 TABLET ORAL DAILY
Qty: 10 TABLET | Refills: 0 | Status: SHIPPED | OUTPATIENT
Start: 2025-03-20 | End: 2025-03-25

## 2025-03-20 RX ORDER — LEVOFLOXACIN 750 MG/1
750 TABLET, FILM COATED ORAL DAILY
Qty: 7 TABLET | Refills: 0 | Status: SHIPPED | OUTPATIENT
Start: 2025-03-20 | End: 2025-03-27

## 2025-03-20 RX ORDER — GUAIFENESIN 600 MG/1
600 TABLET, EXTENDED RELEASE ORAL 2 TIMES DAILY
Qty: 30 TABLET | Refills: 0 | Status: SHIPPED | OUTPATIENT
Start: 2025-03-20 | End: 2025-04-04

## 2025-03-20 RX ORDER — IPRATROPIUM BROMIDE AND ALBUTEROL SULFATE 2.5; .5 MG/3ML; MG/3ML
1 SOLUTION RESPIRATORY (INHALATION) ONCE
Status: COMPLETED | OUTPATIENT
Start: 2025-03-20 | End: 2025-03-20

## 2025-03-20 RX ADMIN — IPRATROPIUM BROMIDE AND ALBUTEROL SULFATE 1 DOSE: 2.5; .5 SOLUTION RESPIRATORY (INHALATION) at 16:57

## 2025-03-20 ASSESSMENT — ENCOUNTER SYMPTOMS: COUGH: 1

## 2025-03-20 NOTE — PATIENT INSTRUCTIONS
Wheezing and rhonchi throughout.  Chest x-ray today to rule out infectious process  Will give a DuoNeb here in clinic today to help with the wheezing    Breathing improved mildly with the DuoNeb.    Treat for COPD exacerbation    Start levofloxacin 750 m pill once a day for 7 days  Start prednisone 20 mg tabs: 2 pills once daily for 5 days    Guaifenesin for cough and to thin secretions  Continue your Arnuity and your albuterol

## 2025-03-20 NOTE — PROGRESS NOTES
Leonel Cr (:  1960) is a 64 y.o. male,New patient, here for evaluation of the following chief complaint(s):  Cough (Cough, congestion, SOB, wheezing and Rib pain/HX of COPD and asthma /X 5 days )      Assessment & Plan :      Wheezing and rhonchi throughout.  Chest x-ray today to rule out infectious process  Will give a DuoNeb here in clinic today to help with the wheezing    Breathing improved mildly with the DuoNeb.    Treat for COPD exacerbation    Start levofloxacin 750 m pill once a day for 7 days  Start prednisone 20 mg tabs: 2 pills once daily for 5 days    Guaifenesin for cough and to thin secretions  Continue your Arnuity and your albuterol      Subjective :    Cough         64 y.o. male presents with coughing, wheezing, shortness of breath for 5 days or so.  He has had some nasal congestion as well, denies any fever, chills, nausea, vomiting.  He reports that he is having rib pain in both sides now that he believes is from coughing so hard.  He has a history of COPD and is compliant with his Arnuity Ellipta inhalers.  He has albuterol in the home he is use them without significant improvement in his symptoms, has not used albuterol yet today though.       Vitals:    25 1542 25 1547   BP: (!) 166/84 (!) 164/80   BP Site: Right Upper Arm Right Upper Arm   Patient Position: Sitting Sitting   BP Cuff Size: Large Adult Large Adult   Pulse: 86    Resp: 16    Temp: 97.8 °F (36.6 °C)    SpO2: 96%    Weight: 88 kg (194 lb)        No results found for this visit on 25.      Objective   Physical Exam  Constitutional:       General: He is not in acute distress.     Appearance: Normal appearance. He is not ill-appearing, toxic-appearing or diaphoretic.   HENT:      Head: Normocephalic and atraumatic.      Nose: Nose normal.      Mouth/Throat:      Mouth: Mucous membranes are moist.   Eyes:      Extraocular Movements: Extraocular movements intact.      Conjunctiva/sclera: Conjunctivae

## 2025-04-02 DIAGNOSIS — K21.9 GASTROESOPHAGEAL REFLUX DISEASE WITHOUT ESOPHAGITIS: ICD-10-CM

## 2025-04-02 RX ORDER — OMEPRAZOLE 20 MG/1
20 CAPSULE, DELAYED RELEASE ORAL DAILY
Qty: 90 CAPSULE | Refills: 0 | Status: SHIPPED | OUTPATIENT
Start: 2025-04-02

## 2025-04-02 NOTE — TELEPHONE ENCOUNTER
Please contact patient for scheduling. Thanks  Writer sent pt a message via [employer].     Last appointment: 11/15/2024 MD Anderson   Next appointment: Advised to follow up in 6 weeks (12/24) - Nothing scheduled   Previous refill encounter(s):   11/09/2024 Prilosec #90 with 1 refill.     For Pharmacy Admin Tracking Only    Program: Medication Refill  Intervention Detail: New Rx: 1, reason: Patient Preference  Time Spent (min): 10    Requested Prescriptions     Pending Prescriptions Disp Refills    omeprazole (PRILOSEC) 20 MG delayed release capsule [Pharmacy Med Name: Omeprazole Oral Capsule Delayed Release 20 MG] 90 capsule 0     Sig: TAKE 1 CAPSULE EVERY DAY

## 2025-04-02 NOTE — TELEPHONE ENCOUNTER
Refill request(s) approved--omeprazole--90-day supply with 0 refill(s).    Refill protocol details (computer-generated) reviewed, as available.      He has prior refill from Nov 2024 for #90 with 1 refills.

## 2025-05-14 DIAGNOSIS — Z87.09 HISTORY OF COPD: ICD-10-CM

## 2025-05-14 DIAGNOSIS — Z87.898 HISTORY OF WHEEZING: ICD-10-CM

## 2025-05-14 DIAGNOSIS — F17.200 CURRENT SMOKER: ICD-10-CM

## 2025-05-14 NOTE — TELEPHONE ENCOUNTER
Last appointment: 11/15/2024 MD Justin   Next appointment:  Advised to follow up in 6 weeks (12/24) - Nothing scheduled   Previous refill encounter(s):   08/27/2024 Albuterol Neb Sol #25 each with 3 refills.     For Pharmacy Admin Tracking Only    Program: Medication Refill  Intervention Detail: New Rx: 1, reason: Patient Preference  Time Spent (min): 5    Requested Prescriptions     Pending Prescriptions Disp Refills    albuterol (PROVENTIL) (2.5 MG/3ML) 0.083% nebulizer solution [Pharmacy Med Name: Albuterol Sulfate Inhalation Nebulization Solution (2.5 MG/3ML) 0.083%] 25 each 3     Sig: INHALE THE CONTENTS OF 1 VIAL VIA NEBULIZER 4 TIMES DAILY AS NEEDED FOR WHEEZING

## 2025-05-24 RX ORDER — ALBUTEROL SULFATE 0.83 MG/ML
SOLUTION RESPIRATORY (INHALATION)
Qty: 25 EACH | Refills: 3 | Status: SHIPPED | OUTPATIENT
Start: 2025-05-24

## 2025-05-24 NOTE — TELEPHONE ENCOUNTER
Refill request(s) approved--albuterol neb sol'n as prior.    Refill protocol details (computer-generated) reviewed, as available.      Requested Prescriptions     Signed Prescriptions Disp Refills    albuterol (PROVENTIL) (2.5 MG/3ML) 0.083% nebulizer solution 25 each 3     Sig: INHALE THE CONTENTS OF 1 VIAL VIA NEBULIZER 4 TIMES DAILY AS NEEDED FOR WHEEZING     Authorizing Provider: CR CHAVIS

## 2025-06-15 ENCOUNTER — OFFICE VISIT (OUTPATIENT)
Age: 65
End: 2025-06-15

## 2025-06-15 VITALS
WEIGHT: 195 LBS | RESPIRATION RATE: 14 BRPM | DIASTOLIC BLOOD PRESSURE: 100 MMHG | TEMPERATURE: 98.7 F | HEART RATE: 98 BPM | BODY MASS INDEX: 27.98 KG/M2 | OXYGEN SATURATION: 97 % | SYSTOLIC BLOOD PRESSURE: 180 MMHG

## 2025-06-15 DIAGNOSIS — J44.1 COPD EXACERBATION (HCC): Primary | ICD-10-CM

## 2025-06-15 DIAGNOSIS — R05.1 ACUTE COUGH: ICD-10-CM

## 2025-06-15 RX ORDER — DOXYCYCLINE HYCLATE 100 MG
100 TABLET ORAL 2 TIMES DAILY
Qty: 14 TABLET | Refills: 0 | Status: SHIPPED | OUTPATIENT
Start: 2025-06-15 | End: 2025-06-22

## 2025-06-15 RX ORDER — PREDNISONE 20 MG/1
40 TABLET ORAL DAILY
Qty: 10 TABLET | Refills: 0 | Status: SHIPPED | OUTPATIENT
Start: 2025-06-15 | End: 2025-06-20